# Patient Record
Sex: MALE | Race: AMERICAN INDIAN OR ALASKA NATIVE
[De-identification: names, ages, dates, MRNs, and addresses within clinical notes are randomized per-mention and may not be internally consistent; named-entity substitution may affect disease eponyms.]

---

## 2019-03-13 ENCOUNTER — HOSPITAL ENCOUNTER (EMERGENCY)
Dept: HOSPITAL 5 - ED | Age: 34
Discharge: HOME | End: 2019-03-13
Payer: SELF-PAY

## 2019-03-13 DIAGNOSIS — K02.9: Primary | ICD-10-CM

## 2019-03-13 PROCEDURE — 99282 EMERGENCY DEPT VISIT SF MDM: CPT

## 2019-03-13 NOTE — EMERGENCY DEPARTMENT REPORT
Chief Complaint: Dental/Oral


Stated Complaint: TOOTHACHE


Time Seen by Provider: 03/13/19 12:09





- HPI


History of Present Illness: 





This is a 33 y.o. male that presents with a toothache x 3 days.  Patient reports

throbbing pain for months that is intermittent.  Patient have an appointment 

with the dentist in 2 weeks.  He is requesting medication for pain control.  

Patient states there is no swelling just severe pain.  He reports pain as 10/10 

on pain scale and a constant throbbing sensation.  He denies difficulty 

swallowing.





- ROS


Review of Systems: 





toothache on left lower side





- Exam


Vital Signs: 


                                   Vital Signs











  03/13/19





  12:09


 


Temperature 98.4 F


 


Pulse Rate 98 H


 


Respiratory 16





Rate 


 


Blood Pressure 148/73


 


O2 Sat by Pulse 94





Oximetry 











Physical Exam: 





GENERAL:  The patient is looking well, in no acute distress.


HEENT:  Atraumatic and normocephalic. Pupils are equal, round, reactive to 

light, and accommodation. Extraocular movements are intact. There is no icterus,

cyanosis, or pallor of the conjunctivae. Tympanic membranes normal bilaterally. 

Nasal turbinates are clear without exudates. Sinuses nontender to percussion. 

Posterior pharynx is normal. No exudates are noted.  Dark brown/black dental 

caries #18, tenderness on percussion, no swelling


CHEST:  Air entry is adequate bilaterally with no rhonchi, and crackles.


HEART:  Sounds 1 and 2 are heard and are normal. Regular rate and rhythm, no 

tachycardic, murmurs, gallops, or rubs.


ABDOMEN:  Soft and nontender. Bowel sounds are present and normal. There is no 

hepatosplenomegaly.


SKIN:  Without rash.


EXTREMITIES:  Without edema, cyanosis, or clubbing.





MSE screening note: 


Focused history and physical exam performed.


Due to findings the following was ordered:











ED Medical Decision Making





- Medical Decision Making





This is a 33 y.o. male that presents with dental caries to left lower side.  

This patient was evaluated by this provider.  Vitals are stable and patient is 

in no acute distress.  On focal exam there is a dental caries at #18, no 

palpable mass or abscess.  Start Tylenol #3 and naproxen for pain.  Patient will

need to keep scheduled appointment with dentist and follow up with them.  

Discharged home stable.  Follow-up with dentist from patient given emergency 

dental follow-up.  She agreed with ED plan.











ED Disposition for MSE


Clinical Impression: 


 Dental caries, Toothache





Disposition: DC-01 TO HOME OR SELFCARE


Is pt being admited?: No


Does the pt Need Aspirin: No


Condition: Stable


Instructions:  Dental Caries (ED), Toothache (ED)


Additional Instructions: 


Take pain medication every 6-8 hours as needed.


Follow up with a dentist for continued care.


Prescriptions: 


Naproxen [Naprosyn] 500 mg PO TID #15 tablet


Acetaminophen/Codeine [Tylenol /Codeine # 3 tab] 1 tab PO Q6H PRN #12 tab


 PRN Reason: Pain , Severe (7-10)


Referrals: 


[facility] [Outside] - 3-5 Days


[facility] [Outside] - 3-5 Days


[facility] [Outside] - 3-5 Days


Forms:  Work/School Release Form(ED)


Time of Disposition: 12:41